# Patient Record
Sex: FEMALE | Race: BLACK OR AFRICAN AMERICAN | Employment: PART TIME | URBAN - METROPOLITAN AREA
[De-identification: names, ages, dates, MRNs, and addresses within clinical notes are randomized per-mention and may not be internally consistent; named-entity substitution may affect disease eponyms.]

---

## 2019-06-19 ENCOUNTER — HOSPITAL ENCOUNTER (EMERGENCY)
Age: 39
Discharge: HOME | End: 2019-06-19
Attending: EMERGENCY MEDICINE
Payer: SUBSIDIZED

## 2019-06-19 VITALS
TEMPERATURE: 98.8 F | RESPIRATION RATE: 18 BRPM | BODY MASS INDEX: 29.57 KG/M2 | HEIGHT: 66 IN | WEIGHT: 184 LBS | OXYGEN SATURATION: 98 % | DIASTOLIC BLOOD PRESSURE: 85 MMHG | HEART RATE: 95 BPM | SYSTOLIC BLOOD PRESSURE: 114 MMHG

## 2019-06-19 DIAGNOSIS — N89.8 VAGINAL DISCHARGE: ICD-10-CM

## 2019-06-19 DIAGNOSIS — N30.01 ACUTE CYSTITIS WITH HEMATURIA: Primary | ICD-10-CM

## 2019-06-19 LAB
APPEARANCE UR: ABNORMAL
BACTERIA URNS QL MICRO: ABNORMAL /HPF
BILIRUB UR QL: NEGATIVE
CLUE CELLS VAG QL WET PREP: NORMAL
COLOR UR: ABNORMAL
EPITH CASTS URNS QL MICRO: ABNORMAL /LPF
GLUCOSE UR STRIP.AUTO-MCNC: NEGATIVE MG/DL
HCG UR QL: NEGATIVE
HGB UR QL STRIP: ABNORMAL
KETONES UR QL STRIP.AUTO: NEGATIVE MG/DL
KOH PREP SPEC: NORMAL
LEUKOCYTE ESTERASE UR QL STRIP.AUTO: NEGATIVE
MUCOUS THREADS URNS QL MICRO: ABNORMAL /LPF
NITRITE UR QL STRIP.AUTO: POSITIVE
PH UR STRIP: 5.5 [PH] (ref 5–8)
PROT UR STRIP-MCNC: NEGATIVE MG/DL
RBC #/AREA URNS HPF: ABNORMAL /HPF (ref 0–5)
SERVICE CMNT-IMP: NORMAL
SP GR UR REFRACTOMETRY: 1.02 (ref 1–1.03)
T VAGINALIS VAG QL WET PREP: NORMAL
UA: UC IF INDICATED,UAUC: ABNORMAL
UROBILINOGEN UR QL STRIP.AUTO: 0.2 EU/DL (ref 0.2–1)
WBC URNS QL MICRO: ABNORMAL /HPF (ref 0–4)

## 2019-06-19 PROCEDURE — 81001 URINALYSIS AUTO W/SCOPE: CPT

## 2019-06-19 PROCEDURE — 87210 SMEAR WET MOUNT SALINE/INK: CPT

## 2019-06-19 PROCEDURE — 81025 URINE PREGNANCY TEST: CPT

## 2019-06-19 PROCEDURE — 99284 EMERGENCY DEPT VISIT MOD MDM: CPT

## 2019-06-19 PROCEDURE — 87491 CHLMYD TRACH DNA AMP PROBE: CPT

## 2019-06-19 PROCEDURE — 87086 URINE CULTURE/COLONY COUNT: CPT

## 2019-06-19 RX ORDER — CEPHALEXIN 500 MG/1
500 CAPSULE ORAL 2 TIMES DAILY
Qty: 14 CAP | Refills: 0 | Status: SHIPPED | OUTPATIENT
Start: 2019-06-19 | End: 2019-06-19

## 2019-06-19 RX ORDER — CEPHALEXIN 500 MG/1
500 CAPSULE ORAL 2 TIMES DAILY
Qty: 14 CAP | Refills: 0 | Status: SHIPPED | OUTPATIENT
Start: 2019-06-19 | End: 2019-06-26

## 2019-06-19 NOTE — ED TRIAGE NOTES
Patient presents to the ED with c/o white vaginal discharge x5 days. Pt denies any itching. Pt denies any urinary symptoms. Pt denies any vaginal bleeding. Pt reports an odor.

## 2019-06-19 NOTE — ED PROVIDER NOTES
EMERGENCY DEPARTMENT HISTORY AND PHYSICAL EXAM      Date: 2019  Patient Name: Joon Holland    History of Presenting Illness     Chief Complaint   Patient presents with    Vaginal Discharge       History Provided By: Patient    HPI: Joon Holland, 42 y.o. female with PMHx significant for no chronic medical hx, presents ambulatory to the ED with cc of acute moderate vaginal discharge X 3 days. No medications or modifying factors. Denies fever, chills, nausea, vomiting, urinary symptoms, genital sore or rash, abdominal pain. Denies any new sexual partner or concern for STD. There are no other complaints, changes, or physical findings at this time. PCP: None    No current facility-administered medications on file prior to encounter. Current Outpatient Medications on File Prior to Encounter   Medication Sig Dispense Refill    OTHER Indications: unknown birth control pill         Past History     Past Medical History:  History reviewed. No pertinent past medical history. Past Surgical History:  Past Surgical History:   Procedure Laterality Date    HX  SECTION         Family History:  History reviewed. No pertinent family history. Social History:  Social History     Tobacco Use    Smoking status: Never Smoker    Smokeless tobacco: Never Used   Substance Use Topics    Alcohol use: Yes     Comment: occ    Drug use: No       Allergies:  No Known Allergies      Review of Systems   Review of Systems   Constitutional: Negative. Negative for activity change, appetite change, chills and fever. HENT: Negative. Negative for congestion, ear pain, postnasal drip and sore throat. Eyes: Negative. Negative for pain and visual disturbance. Respiratory: Negative. Negative for cough and shortness of breath. Cardiovascular: Negative. Negative for chest pain. Gastrointestinal: Negative for abdominal pain, anal bleeding, diarrhea, nausea, rectal pain and vomiting. Genitourinary: Positive for vaginal discharge. Negative for difficulty urinating, dysuria, flank pain, frequency, menstrual problem, pelvic pain, urgency, vaginal bleeding and vaginal pain. Musculoskeletal: Negative. Negative for joint swelling. Skin: Negative. Negative for rash. Neurological: Negative. Negative for dizziness, light-headedness and headaches. Psychiatric/Behavioral: Negative. Physical Exam   Physical Exam   Constitutional: She is oriented to person, place, and time. She appears well-developed and well-nourished. No distress. HENT:   Head: Normocephalic and atraumatic. Right Ear: Hearing and external ear normal.   Left Ear: Hearing and external ear normal.   Nose: Nose normal.   Eyes: Pupils are equal, round, and reactive to light. Conjunctivae and EOM are normal.   Neck: Normal range of motion. Pulmonary/Chest: Effort normal. No respiratory distress. Musculoskeletal: Normal range of motion. Neurological: She is alert and oriented to person, place, and time. Skin: Skin is warm and dry. She is not diaphoretic. Psychiatric: She has a normal mood and affect. Her behavior is normal. Judgment and thought content normal.   Nursing note and vitals reviewed.       Diagnostic Study Results     Labs -     Recent Results (from the past 12 hour(s))   WET PREP    Collection Time: 06/19/19  3:34 PM   Result Value Ref Range    Clue cells CLUE CELLS ABSENT      Wet prep NO TRICHOMONAS SEEN     KOH, OTHER SOURCES    Collection Time: 06/19/19  3:34 PM   Result Value Ref Range    Special Requests: NO SPECIAL REQUESTS      KOH NO YEAST SEEN     URINALYSIS W/ REFLEX CULTURE    Collection Time: 06/19/19  3:34 PM   Result Value Ref Range    Color YELLOW/STRAW      Appearance CLOUDY (A) CLEAR      Specific gravity 1.025 1.003 - 1.030      pH (UA) 5.5 5.0 - 8.0      Protein NEGATIVE  NEG mg/dL    Glucose NEGATIVE  NEG mg/dL    Ketone NEGATIVE  NEG mg/dL    Bilirubin NEGATIVE  NEG      Blood SMALL (A) NEG      Urobilinogen 0.2 0.2 - 1.0 EU/dL    Nitrites POSITIVE (A) NEG      Leukocyte Esterase NEGATIVE  NEG      WBC 5-10 0 - 4 /hpf    RBC 5-10 0 - 5 /hpf    Epithelial cells MODERATE (A) FEW /lpf    Bacteria 4+ (A) NEG /hpf    UA:UC IF INDICATED URINE CULTURE ORDERED (A) CNI      Mucus 1+ (A) NEG /lpf   HCG URINE, QL. - POC    Collection Time: 06/19/19  3:37 PM   Result Value Ref Range    Pregnancy test,urine (POC) NEGATIVE  NEG         Radiologic Studies -   No orders to display     CT Results  (Last 48 hours)    None        CXR Results  (Last 48 hours)    None            Medical Decision Making   I am the first provider for this patient. I reviewed the vital signs, available nursing notes, past medical history, past surgical history, family history and social history. Vital Signs-Reviewed the patient's vital signs. Patient Vitals for the past 12 hrs:   Temp Pulse Resp BP SpO2   06/19/19 1457 98.8 °F (37.1 °C) 95 18 114/85 98 %       Pulse Oximetry Analysis - 98% on RA    Records Reviewed: Nursing Notes, Old Medical Records, Previous Radiology Studies and Previous Laboratory Studies    Provider Notes (Medical Decision Making):   Patient presents with vaginal discharge. DDx: Yeast infection, Chlamydia, gonorrhea, trichomonas, herpes, BV, UTI. Will get GC, UA, wet prep, indy. Pt denies concern for STI and refuses tx at this time. Educated pt on risk of PID, infertility and possible sepsis/ death. Pt endorses understanding. Pt has been re-examined and states that they are feeling better and have no new complaints. Laboratory tests, medications, x-rays, diagnosis, follow up plan and return instructions have been reviewed and discussed with the patient. The patient has had the opportunity to ask questions about their care. Patient expresses understanding and agreement with care plan, follow up and return instructions.   Patent agrees to return in 48 hours if their symptoms are not improving or immediately if they have any change in their condition. Patient expresses understanding that genital swabs for G/C are pending and that they should refrain from sexual activity until the results are known. Patient expresses understanding that it is their responsibility to call back to the ER in 72 hours to obtain the results of the G/C swabs if they have not been contacted by the ER. ED Course:   Initial assessment performed. The patients presenting problems have been discussed, and they are in agreement with the care plan formulated and outlined with them. I have encouraged them to ask questions as they arise throughout their visit. Critical Care Time:   0    Disposition:  4:10 PM  I have discussed with patient their diagnosis, treatment, and follow up plan. The patient agrees to follow up as outlined in discharge paperwork and also to return to the ED with any worsening. Olive Robles PA-C    PLAN:  1. Current Discharge Medication List      START taking these medications    Details   cephALEXin (KEFLEX) 500 mg capsule Take 1 Cap by mouth two (2) times a day for 7 days. Qty: 14 Cap, Refills: 0         CONTINUE these medications which have NOT CHANGED    Details   OTHER Indications: unknown birth control pill         STOP taking these medications       HYDROcodone-acetaminophen (NORCO) 5-325 mg per tablet Comments:   Reason for Stoppin.   Follow-up Information     Follow up With Specialties Details Why [address] [facility]  Schedule an appointment as soon as possible for a visit in 1 week As needed, If symptoms worsen [address] [facility] [address] [address] [facility]  [phone]        Return to ED if worse     Diagnosis     Clinical Impression:   1. Acute cystitis with hematuria    2. Vaginal discharge        Attestations:    Please note that this dictation was completed with Dragon, computer voice recognition software.   Quite often unanticipated grammatical, syntax, homophones, and other interpretive errors are inadvertently transcribed by the computer software. Please disregard these errors. Additionally, please excuse any errors that have escaped final proofreading.

## 2019-06-21 LAB
BACTERIA SPEC CULT: ABNORMAL
C TRACH DNA SPEC QL NAA+PROBE: NEGATIVE
CC UR VC: ABNORMAL
N GONORRHOEA DNA SPEC QL NAA+PROBE: NEGATIVE
SAMPLE TYPE: NORMAL
SERVICE CMNT-IMP: ABNORMAL
SERVICE CMNT-IMP: NORMAL
SPECIMEN SOURCE: NORMAL

## 2021-12-20 NOTE — ED NOTES
Felicia George at bedside reviewing patient's discharge instructions and reviewing medications. Patient ambulatory home. Patient in no apparent distress.  Unable to reassess pain/vitals
Pt reports vaginal discharge that smells like \"ammonia\" for the past 5 days. Pt denies dysuria, oliguria, polyuria. Pt denies other sx. Emergency Department Nursing Plan of Care       The Nursing Plan of Care is developed from the Nursing assessment and Emergency Department Attending provider initial evaluation. The plan of care may be reviewed in the ED Provider note.     The Plan of Care was developed with the following considerations:   Patient / Family readiness to learn indicated by:verbalized understanding  Persons(s) to be included in education: patient  Barriers to Learning/Limitations:No    Signed     Chris Thomas    6/19/2019   3:29 PM
- - -

## 2022-04-06 ENCOUNTER — HOSPITAL ENCOUNTER (EMERGENCY)
Age: 42
Discharge: HOME | End: 2022-04-06
Attending: EMERGENCY MEDICINE
Payer: MEDICAID

## 2022-04-06 VITALS
WEIGHT: 180 LBS | OXYGEN SATURATION: 99 % | SYSTOLIC BLOOD PRESSURE: 138 MMHG | HEART RATE: 94 BPM | RESPIRATION RATE: 16 BRPM | TEMPERATURE: 99.4 F | BODY MASS INDEX: 28.93 KG/M2 | DIASTOLIC BLOOD PRESSURE: 75 MMHG | HEIGHT: 66 IN

## 2022-04-06 DIAGNOSIS — R59.0 LYMPHADENOPATHY, CERVICAL: ICD-10-CM

## 2022-04-06 DIAGNOSIS — H92.02 OTALGIA OF LEFT EAR: Primary | ICD-10-CM

## 2022-04-06 DIAGNOSIS — J01.90 ACUTE SINUSITIS, RECURRENCE NOT SPECIFIED, UNSPECIFIED LOCATION: ICD-10-CM

## 2022-04-06 PROCEDURE — 99283 EMERGENCY DEPT VISIT LOW MDM: CPT

## 2022-04-06 RX ORDER — CETIRIZINE HCL 10 MG
10 TABLET ORAL DAILY
Qty: 30 TABLET | Refills: 0 | Status: SHIPPED | OUTPATIENT
Start: 2022-04-06 | End: 2022-05-06

## 2022-04-06 RX ORDER — AMOXICILLIN AND CLAVULANATE POTASSIUM 875; 125 MG/1; MG/1
1 TABLET, FILM COATED ORAL 2 TIMES DAILY
Qty: 14 TABLET | Refills: 0 | Status: SHIPPED | OUTPATIENT
Start: 2022-04-06 | End: 2022-04-13

## 2022-04-06 RX ORDER — FLUTICASONE PROPIONATE 50 MCG
2 SPRAY, SUSPENSION (ML) NASAL DAILY
Qty: 16 G | Refills: 0 | Status: SHIPPED | OUTPATIENT
Start: 2022-04-06

## 2022-04-06 NOTE — ED NOTES

## 2022-04-07 NOTE — ED PROVIDER NOTES
EMERGENCY DEPARTMENT HISTORY AND PHYSICAL EXAM      Date: 2022  Patient Name: Renate Bailon    History of Presenting Illness     Chief Complaint   Patient presents with    Ear Pain       History Provided By: Patient    HPI: Renate Bailon, 46 y.o. female with no reported chronic medical conditions, presents ambulatory to the ED for evaluation of left ear pain and swollen lymph node for the past week. States ear pain is described as a fullness or like \"somethings inside. \" Denies any drainage or bleeding. Denies any changes in hearing or hearing loss. Endorses mild, soreness to inflamed lymph node to left neck. Denies facial swelling or rashes. Denies sore throat, difficulty swallowing, difficulty opening mouth. Difficulty speaking or changes in voice. Denies dental pain. Tolerating solids and liquids well, no changes in bowel or bladder habits. Denies trauma. Denies headache, eye pain, changes in vision,  neck pain, chest pain, SOB. Denies fevers or chills, abdominal pain, N/V/D, rashes, weakness or LOC. Has not taken any medications or attempted any symptomatic management techniques prior to arrival. Denies night sweats or weight loss. No other complaints at this time. There are no other complaints, changes, or physical findings at this time. PCP: None    No current facility-administered medications on file prior to encounter. Current Outpatient Medications on File Prior to Encounter   Medication Sig Dispense Refill    OTHER Indications: unknown birth control pill         Past History     Past Medical History:  History reviewed. No pertinent past medical history. Past Surgical History:  Past Surgical History:   Procedure Laterality Date    HX  SECTION         Family History:  History reviewed. No pertinent family history.     Social History:  Social History     Tobacco Use    Smoking status: Never Smoker    Smokeless tobacco: Never Used   Substance Use Topics    Alcohol use: Yes     Comment: occ    Drug use: No       Allergies:  No Known Allergies      Review of Systems   Review of Systems   Constitutional: Negative for appetite change, chills and fever. HENT: Positive for ear pain. Negative for congestion, dental problem, drooling, ear discharge, facial swelling, hearing loss, sore throat, tinnitus, trouble swallowing and voice change. Eyes: Negative for pain and visual disturbance. Respiratory: Negative for cough and shortness of breath. Cardiovascular: Negative for chest pain. Gastrointestinal: Negative for abdominal pain, constipation, diarrhea, nausea and vomiting. Genitourinary: Negative for difficulty urinating, dysuria and frequency. Musculoskeletal: Negative for neck pain and neck stiffness. Skin: Negative for rash. Neurological: Negative for syncope, weakness, light-headedness and headaches. All other systems reviewed and are negative. Physical Exam   Physical Exam  Vitals and nursing note reviewed. Constitutional:       General: She is not in acute distress. Appearance: Normal appearance. She is normal weight. She is not ill-appearing, toxic-appearing or diaphoretic. Comments: 46 y.o. female   HENT:      Head: Normocephalic and atraumatic. Jaw: No trismus, tenderness, swelling, pain on movement or malocclusion. Right Ear: Tympanic membrane, ear canal and external ear normal. No drainage or tenderness. No mastoid tenderness. Tympanic membrane is not perforated, erythematous or bulging. Left Ear: Tympanic membrane, ear canal and external ear normal. No drainage or tenderness. No mastoid tenderness. Tympanic membrane is not perforated, erythematous or bulging. Nose: Nose normal.      Comments: Nares erythematous and boggy with mild congestion      Mouth/Throat:      Mouth: Mucous membranes are moist. No oral lesions or angioedema. Dentition: Normal dentition. Pharynx: Oropharynx is clear. Uvula midline.  No pharyngeal swelling, oropharyngeal exudate, posterior oropharyngeal erythema or uvula swelling. Comments: No Krzysztof's angina  Eyes:      Extraocular Movements: Extraocular movements intact. Conjunctiva/sclera: Conjunctivae normal.   Neck:      Thyroid: No thyroid mass, thyromegaly or thyroid tenderness. Trachea: Trachea and phonation normal.      Comments: No facial swelling or rashes. Clear and coherent speech. Tolerating secretions well. Cardiovascular:      Rate and Rhythm: Normal rate and regular rhythm. Heart sounds: Normal heart sounds. No murmur heard. No friction rub. No gallop. Pulmonary:      Effort: Pulmonary effort is normal. No respiratory distress. Breath sounds: Normal breath sounds. No stridor. No wheezing. Abdominal:      General: There is no distension. Palpations: Abdomen is soft. There is no mass. Tenderness: There is no abdominal tenderness. There is no guarding. Musculoskeletal:         General: Normal range of motion. Cervical back: Normal range of motion. Lymphadenopathy:      Cervical: Cervical adenopathy present. Left cervical: Superficial cervical adenopathy present. Skin:     General: Skin is warm and dry. Neurological:      General: No focal deficit present. Mental Status: She is alert and oriented to person, place, and time. Psychiatric:         Mood and Affect: Mood normal.         Behavior: Behavior normal.         Diagnostic Study Results     Labs -   No results found for this or any previous visit (from the past 12 hour(s)). Radiologic Studies -   No orders to display     CT Results  (Last 48 hours)    None        CXR Results  (Last 48 hours)    None            Medical Decision Making   I am the first provider for this patient. I reviewed the vital signs, available nursing notes, past medical history, past surgical history, family history and social history.     Vital Signs-Reviewed the patient's vital signs. Patient Vitals for the past 12 hrs:   Temp Pulse Resp BP SpO2   04/06/22 1657 99.4 °F (37.4 °C) 94 16 138/75 99 %       Records Reviewed: Nursing Notes and Old Medical Records    Provider Notes (Medical Decision Making):   Patient presents ED for evaluation of ear fullness, and enlarged left cervical lymph node for the past 2 weeks. Nares erythematous and boggy with moderate congestion. Suspect this is likely secondary to sinusitis, given duration of symptoms will place on antibiotics. Counseled use of over-the-counter allergy medications (provided prescription for Zyrtec and Flonase. Patient is very well appearing. Afebrile, nontoxic-appearing. No evidence of TM perforation, mastoiditis, malignant otitis externa. No suggestion of infectious, airway, or ENT immediately life-threatening medical condition. No suggestion of epiglottitis, Ludwigs angina, or other obstructive or infectious emergent condition. Shared decision making performed and care plan created together, discussed diagnosis and treatment plan. Counseled additional symptomatic management techniques. PCP follow-up. ENT follow-up if needed. Verbal return precautions discussed at length. Patient verbalizes understanding and agreement of current plan of care. ED Course:   Initial assessment performed. The patients presenting problems have been discussed, and they are in agreement with the care plan formulated and outlined with them. I have encouraged them to ask questions as they arise throughout their visit. Critical Care Time: None    Disposition:  Discharge     PLAN:  1. Discharge Medication List as of 4/6/2022  6:51 PM      START taking these medications    Details   amoxicillin-clavulanate (Augmentin) 875-125 mg per tablet Take 1 Tablet by mouth two (2) times a day for 7 days.  Indications: acute bacterial infection of the sinuses, Normal, Disp-14 Tablet, R-0      fluticasone propionate (FLONASE) 50 mcg/actuation nasal spray 2 Sprays by Both Nostrils route daily. , Normal, Disp-16 g, R-0      cetirizine (ZyrTEC) 10 mg tablet Take 1 Tablet by mouth daily for 30 days. , Normal, Disp-30 Tablet, R-0         CONTINUE these medications which have NOT CHANGED    Details   OTHER Indications: unknown birth control pill, Historical Med           2. Follow-up Information     Follow up With Specialties Details Why [address] [facility] EMERGENCY DEPT Emergency Medicine  As needed, If symptoms worsen [address]  In 1 week  [address], [address] [facility] [address]    [address] [facility] ENT Otolaryngology  As needed, If symptoms worsen [address] [facility]  [phone]        Return to ED if worse     Diagnosis     Clinical Impression:   1. Otalgia of left ear    2. Lymphadenopathy, cervical    3. Acute sinusitis, recurrence not specified, unspecified location          Please note that this dictation was completed with Beezag, the computer voice recognition software. Quite often unanticipated grammatical, syntax, homophones, and other interpretive errors are inadvertently transcribed by the computer software. Please disregards these errors. Please excuse any errors that have escaped final proofreading.

## 2022-09-04 ENCOUNTER — APPOINTMENT (OUTPATIENT)
Dept: GENERAL RADIOLOGY | Age: 42
End: 2022-09-04
Attending: PHYSICIAN ASSISTANT
Payer: COMMERCIAL

## 2022-09-04 ENCOUNTER — HOSPITAL ENCOUNTER (EMERGENCY)
Age: 42
Discharge: HOME | End: 2022-09-04
Attending: EMERGENCY MEDICINE
Payer: COMMERCIAL

## 2022-09-04 VITALS
BODY MASS INDEX: 28.93 KG/M2 | OXYGEN SATURATION: 96 % | HEART RATE: 83 BPM | WEIGHT: 180 LBS | HEIGHT: 66 IN | DIASTOLIC BLOOD PRESSURE: 83 MMHG | RESPIRATION RATE: 16 BRPM | SYSTOLIC BLOOD PRESSURE: 141 MMHG | TEMPERATURE: 97.4 F

## 2022-09-04 DIAGNOSIS — M19.072 OSTEOARTHRITIS OF FIRST METATARSOPHALANGEAL (MTP) JOINT OF LEFT FOOT: Primary | ICD-10-CM

## 2022-09-04 PROCEDURE — 99283 EMERGENCY DEPT VISIT LOW MDM: CPT

## 2022-09-04 PROCEDURE — 73630 X-RAY EXAM OF FOOT: CPT

## 2022-09-04 RX ORDER — IBUPROFEN 800 MG/1
800 TABLET ORAL
Qty: 20 TABLET | Refills: 0 | Status: SHIPPED | OUTPATIENT
Start: 2022-09-04 | End: 2022-09-11

## 2022-09-04 RX ORDER — DICLOFENAC SODIUM 75 MG/1
75 TABLET, DELAYED RELEASE ORAL
Qty: 20 TABLET | Refills: 0 | Status: SHIPPED | OUTPATIENT
Start: 2022-09-04

## 2022-09-04 NOTE — ED NOTES
Discharge and medication instructions reviewed with the patient.  Patient discharged ambulatory with a steady gait

## 2022-09-04 NOTE — ED NOTES
Emergency Department Nursing Plan of Care       The Nursing Plan of Care is developed from the Nursing assessment and Emergency Department Attending provider initial evaluation. The plan of care may be reviewed in the ED Provider note.     The Plan of Care was developed with the following considerations:   Patient / Family readiness to learn indicated by:verbalized understanding  Persons(s) to be included in education: patient  Barriers to Learning/Limitations:No    Signed     Jun Hensley RN    9/4/2022   12:57 PM

## 2022-09-04 NOTE — ED PROVIDER NOTES
EMERGENCY DEPARTMENT HISTORY AND PHYSICAL EXAM      Date: 2022  Patient Name: Vitaly Wong    History of Presenting Illness     Chief Complaint   Patient presents with    Foot Burn     Foot pain that began today after walking. Patient states that she had a fall two years ago and injured left foot. Foot Pain       History Provided By: Patient    HPI: Vitaly Wong, 38 y.o. female with no significant past medical history, presents to the ED with cc of left foot pain. The patient reports that she fell a few years ago and injured her left foot. She reports it never fully healed and she has had intermittent pain since then. The pain is located to the first distal metatarsal and MTP joint and is worse with weightbearing. She has taken ibuprofen with some relief. She reports she never had x-rays done since the injury. She denies numbness. There are no other complaints, changes, or physical findings at this time. PCP: None    No current facility-administered medications on file prior to encounter. Current Outpatient Medications on File Prior to Encounter   Medication Sig Dispense Refill    fluticasone propionate (FLONASE) 50 mcg/actuation nasal spray 2 Sprays by Both Nostrils route daily. 16 g 0    OTHER Indications: unknown birth control pill         Past History     Past Medical History:  History reviewed. No pertinent past medical history. Past Surgical History:  Past Surgical History:   Procedure Laterality Date    HX  SECTION         Family History:  History reviewed. No pertinent family history. Social History:  Social History     Tobacco Use    Smoking status: Never    Smokeless tobacco: Never   Substance Use Topics    Alcohol use: Yes     Comment: occ    Drug use: No       Allergies:  No Known Allergies      Review of Systems   Review of Systems   Constitutional:  Negative for chills and fever. HENT:  Negative for ear pain and sore throat.     Eyes:  Negative for redness and visual disturbance. Respiratory:  Negative for cough and shortness of breath. Cardiovascular:  Negative for chest pain and palpitations. Gastrointestinal:  Negative for abdominal pain, nausea and vomiting. Genitourinary:  Negative for dysuria and hematuria. Musculoskeletal:  Negative for back pain and gait problem. Positive for left foot pain   Skin:  Negative for rash and wound. Neurological:  Negative for dizziness and headaches. Psychiatric/Behavioral:  Negative for behavioral problems and confusion. All other systems reviewed and are negative. Physical Exam   Physical Exam  Vitals and nursing note reviewed. Constitutional:       Appearance: She is not toxic-appearing. HENT:      Head: Normocephalic and atraumatic. Mouth/Throat:      Mouth: Mucous membranes are moist.   Eyes:      Extraocular Movements: Extraocular movements intact. Pupils: Pupils are equal, round, and reactive to light. Cardiovascular:      Rate and Rhythm: Normal rate and regular rhythm. Pulmonary:      Effort: Pulmonary effort is normal. No respiratory distress. Musculoskeletal:         General: No deformity. Normal range of motion. Cervical back: Normal range of motion and neck supple. Comments: Tenderness to palpation over the left distal first metatarsal and MTP joint. No overlying erythema or warmth. No obvious deformity. 2+ DP and PT pulses. Capillary refill less than 2 seconds in the distal toe. Distal sensation intact. Skin:     General: Skin is warm and dry. Neurological:      General: No focal deficit present. Mental Status: She is alert and oriented to person, place, and time. Psychiatric:         Behavior: Behavior normal.         Diagnostic Study Results     Labs -   No results found for this or any previous visit (from the past 12 hour(s)).     Radiologic Studies -   XR FOOT LT MIN 3 V   Final Result   Nonspecific plantar soft tissue swelling CT Results  (Last 48 hours)      None          CXR Results  (Last 48 hours)      None              Medical Decision Making   I am the first provider for this patient. I reviewed the vital signs, available nursing notes, past medical history, past surgical history, family history and social history. Vital Signs-Reviewed the patient's vital signs. Patient Vitals for the past 12 hrs:   Temp Pulse Resp BP SpO2   09/04/22 1259 97.4 °F (36.3 °C) -- -- -- --   09/04/22 1233 -- 83 16 (!) 141/83 96 %         Records Reviewed: Nursing Notes and Old Medical Records      Provider Notes (Medical Decision Making):   DDx: Osteoarthritis, chronic fracture, sprain, gout    This is a 74-year-old female who presents with intermittent left foot pain after trauma a few years ago. There is no evidence of infection on exam.  Plan for x-ray. ED Course:   Initial assessment performed. The patients presenting problems have been discussed, and they are in agreement with the care plan formulated and outlined with them. I have encouraged them to ask questions as they arise throughout their visit. Disposition:  1:42 PM  The patient has been re-evaluated and is ready for discharge. Reviewed available results with patient. Counseled patient on diagnosis and care plan. Patient has expressed understanding, and all questions have been answered. Patient agrees with plan and agrees to follow up as recommended, or to return to the ED if their symptoms worsen. Discharge instructions have been provided and explained to the patient, along with reasons to return to the ED. PLAN:  1.    Discharge Medication List as of 9/4/2022  1:42 PM        START taking these medications    Details   diclofenac EC (VOLTAREN) 75 mg EC tablet Take 1 Tablet by mouth two (2) times daily as needed for Pain., Normal, Disp-20 Tablet, R-0Patient instructed not to take diclofenac and ibuprofen at the same time, but wants to see which one will work better. ibuprofen (MOTRIN) 800 mg tablet Take 1 Tablet by mouth every six (6) hours as needed for Pain for up to 7 days. , Normal, Disp-20 Tablet, R-0Patient instructed not to take diclofenac and ibuprofen at the same time, but wants to see which one will work better. CONTINUE these medications which have NOT CHANGED    Details   fluticasone propionate (FLONASE) 50 mcg/actuation nasal spray 2 Sprays by Both Nostrils route daily. , Normal, Disp-16 g, R-0      OTHER Indications: unknown birth control pill, Historical Med           2. Follow-up Information       Follow up With Specialties Details Why Contact Info    Nina Peck DPM Podiatry Call  to schedule a follow up with foot specialist [address] [place] [address]  [phone]            Return to ED if worse     Diagnosis     Clinical Impression:   1. 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

## 2022-09-04 NOTE — DISCHARGE INSTRUCTIONS
Do not take ibuprofen and diclofenac at the same time. Narrative:    EXAM: XR FOOT LT MIN 3 V     INDICATION: left foot pain, intermittently for some time. COMPARISON: None. FINDINGS: Three views of the left foot demonstrate no fracture or other acute   osseous or articular abnormality. There is mild osteoarthritis of the first MTP   joint. Plantar soft tissue swelling is seen.

## 2023-01-14 ENCOUNTER — HOSPITAL ENCOUNTER (EMERGENCY)
Age: 43
Discharge: HOME | End: 2023-01-14
Attending: EMERGENCY MEDICINE
Payer: COMMERCIAL

## 2023-01-14 VITALS
BODY MASS INDEX: 28.93 KG/M2 | WEIGHT: 180 LBS | OXYGEN SATURATION: 99 % | DIASTOLIC BLOOD PRESSURE: 87 MMHG | RESPIRATION RATE: 18 BRPM | SYSTOLIC BLOOD PRESSURE: 138 MMHG | HEIGHT: 66 IN | HEART RATE: 89 BPM | TEMPERATURE: 99.8 F

## 2023-01-14 DIAGNOSIS — S01.511A LIP LACERATION, INITIAL ENCOUNTER: Primary | ICD-10-CM

## 2023-01-14 PROCEDURE — 75810000293 HC SIMP/SUPERF WND  RPR

## 2023-01-14 PROCEDURE — 99282 EMERGENCY DEPT VISIT SF MDM: CPT

## 2023-01-14 NOTE — LETTER
[facility] EMERGENCY DEPT  [address] [facility] [address] [phone]    Work/School Note    Date: 1/14/2023    To Whom It May concern:    Vickie Benz was seen and treated today in the emergency room by the following provider(s):  Attending Provider: Naeem Guerra DO  Nurse Practitioner: Roanne Hashimoto, NP. Vickie Benz may return to work on Jan 16 2023.     Sincerely,          Jolanta Dueñas NP

## 2023-01-15 NOTE — ED TRIAGE NOTES
Chief Complaint   Patient presents with    Lip Laceration     Patient presents to the ED for a left lip laceration that occurred PTA after she tripped, fell, and hit her lip on the step. Denies head injury or LOC. Laceration noted to L upper lip, bleeding controlled.

## 2023-01-15 NOTE — ED PROVIDER NOTES
[facility] EMERGENCY DEPT  EMERGENCY DEPARTMENT ENCOUNTER       Pt Name: Beck Ruano  MRN: [id number]  [place] 1980  Date of evaluation: 2023  Provider: Caridad Clemens NP   PCP: None  Note Started: 12:28 AM 1/15/23     CHIEF COMPLAINT       Chief Complaint   Patient presents with    Lip Laceration        HISTORY OF PRESENT ILLNESS: 1 or more elements      History From: Patient  HPI Limitations : None     Beck Ruano is a 38 y.o. female who presents with lip laceration acute onset just prior to arrival.  Patient states she was going up the stairs and fell and hit her lip on the step. Patient states she had been drinking. She denies hitting her head or loss of consciousness. Status not up-to-date. Nursing Notes were all reviewed and agreed with or any disagreements were addressed in the HPI. REVIEW OF SYSTEMS      Review of Systems   Constitutional:  Negative for fatigue and fever. Respiratory:  Negative for shortness of breath and wheezing. Cardiovascular:  Negative for chest pain. Gastrointestinal:  Negative for abdominal pain. Musculoskeletal:  Negative for arthralgias, myalgias, neck pain and neck stiffness. Skin:  Positive for wound. Negative for pallor and rash. Neurological:  Negative for dizziness, tremors and headaches. All other systems reviewed and are negative. Positives and Pertinent negatives as per HPI. PAST HISTORY     Past Medical History:  History reviewed. No pertinent past medical history. Past Surgical History:  Past Surgical History:   Procedure Laterality Date    HX  SECTION         Family History:  History reviewed. No pertinent family history.     Social History:  Social History     Tobacco Use    Smoking status: Never    Smokeless tobacco: Never   Substance Use Topics    Alcohol use: Yes     Comment: occ    Drug use: No       Allergies:  No Known Allergies    CURRENT MEDICATIONS      Discharge Medication List as of 2023 11:03 PM        CONTINUE these medications which have NOT CHANGED    Details   diclofenac EC (VOLTAREN) 75 mg EC tablet Take 1 Tablet by mouth two (2) times daily as needed for Pain., Normal, Disp-20 Tablet, R-0Patient instructed not to take diclofenac and ibuprofen at the same time, but wants to see which one will work better. fluticasone propionate (FLONASE) 50 mcg/actuation nasal spray 2 Sprays by Both Nostrils route daily. , Normal, Disp-16 g, R-0      OTHER Indications: unknown birth control pill, Historical Med             PHYSICAL EXAM      ED Triage Vitals [01/14/23 2201]   ED Encounter Vitals Group      /87      Pulse (Heart Rate) 89      Resp Rate 18      Temp 99.8 °F (37.7 °C)      Temp src       O2 Sat (%) 99 %      Weight 180 lb      Height 5' 6\"        Physical Exam  Vitals and nursing note reviewed. Constitutional:       General: She is not in acute distress. Appearance: Normal appearance. She is well-developed. HENT:      Head: Normocephalic and atraumatic. Right Ear: External ear normal.      Left Ear: External ear normal.      Nose: Nose normal.      Mouth/Throat:      Mouth: Mucous membranes are moist.     Eyes:      Conjunctiva/sclera: Conjunctivae normal.   Cardiovascular:      Rate and Rhythm: Normal rate and regular rhythm. Heart sounds: Normal heart sounds. Pulmonary:      Effort: Pulmonary effort is normal. No respiratory distress. Breath sounds: Normal breath sounds. No wheezing. Abdominal:      General: Bowel sounds are normal.      Palpations: Abdomen is soft. Tenderness: There is no abdominal tenderness. Musculoskeletal:         General: Normal range of motion. Cervical back: Normal range of motion and neck supple. Lymphadenopathy:      Cervical: No cervical adenopathy. Skin:     General: Skin is warm and dry. Findings: No rash. Neurological:      Mental Status: She is alert and oriented to person, place, and time.       Cranial Nerves: No cranial nerve deficit. Coordination: Coordination normal.   Psychiatric:         Behavior: Behavior normal.         Thought Content: Thought content normal.         Judgment: Judgment normal.        DIAGNOSTIC RESULTS   LABS:     No results found for this or any previous visit (from the past 12 hour(s)). EKG: When ordered, EKG's are interpreted by the Emergency Department Physician in the absence of a cardiologist.  Please see their note for interpretation of EKG. RADIOLOGY:  Non-plain film images such as CT, Ultrasound and MRI are read by the radiologist. Plain radiographic images are visualized and preliminarily interpreted by the ED Provider with the below findings:          Interpretation per the Radiologist below, if available at the time of this note:     No results found. PROCEDURES   Unless otherwise noted below, none  Wound Repair    Date/Time: 1/14/2023 10:30 PM  Performed by: [address] [place] [address] provider: Dr Cari Jaramillo  Preparation: sterile field established (salilne)  Pre-procedure re-eval: Immediately prior to the procedure, the patient was reevaluated and found suitable for the planned procedure and any planned medications. Location: upper l ip. Wound length: two one cm lacerations. Anesthesia:  Local Anesthetic: lidocaine 1% without epinephrine  Anesthetic total: 1 mL  Foreign bodies: no foreign bodies  Irrigation solution: saline  Irrigation method: tap  Skin closure: 5-0 nylon  Number of sutures: 3  Technique: simple and interrupted  Approximation: close  Lip approximation: vermillion border well aligned  Dressing: antibiotic ointment  My total time at bedside, performing this procedure was 1-15 minutes.     Wound Repair    Date/Time: 1/14/2023 10:40 PM  Performed by: NPSupervising provider: Dr Cari Jaramillo  Preparation: sterile field established (saline)  Pre-procedure re-eval: Immediately prior to the procedure, the patient was reevaluated and found suitable for the planned procedure and any planned medications. Time out: Immediately prior to the procedure a time out was called to verify the correct patient, procedure, equipment, staff and marking as appropriate. .  Location details: lip  Wound length: 1 cm. Anesthesia:  Local Anesthetic: lidocaine 1% without epinephrine  Anesthetic total: 1 mL  Foreign bodies: no foreign bodies  Irrigation solution: saline  Irrigation method: tap  Debridement: none  Skin closure: 5-0 nylon  Number of sutures: 4  Technique: simple and interrupted  Dressing: antibiotic ointment  My total time at bedside, performing this procedure was 1-15 minutes. CRITICAL CARE TIME       EMERGENCY DEPARTMENT COURSE and DIFFERENTIAL DIAGNOSIS/MDM   Vitals:    Vitals:    01/14/23 2201   BP: 138/87   Pulse: 89   Resp: 18   Temp: 99.8 °F (37.7 °C)   SpO2: 99%   Weight: 81.6 kg (180 lb)   Height: 5' 6\" (1.676 m)        Patient was given the following medications:  Medications - No data to display    CONSULTS: (Who and What was discussed)  None    Chronic Conditions: none    Social Determinants affecting Dx or Tx: None    Records Reviewed (source and summary of external records): Nursing Notes    CC/HPI Summary, DDx, ED Course, and Reassessment: 44-year-old female presents with upper lip laceration acute onset just prior to arrival hit her face on stairs no loss of consciousness no trauma to teeth or inner lip exam 2 jagged lacerations to upper lip do not cross the vermilion border will require sutures differential diagnosis laceration versus puncture wound     ED course: Patient concern regarding sutures being placed. Advised patient laceration do require sutures and patient is in agreement at this time. Disposition Considerations (Tests not done, Shared Decision Making, Pt Expectation of Test or Tx.):      FINAL IMPRESSION     1.  Lip laceration, initial encounter          DISPOSITION/PLAN   Discharged  Patient declined tetanus immunization  Discharge Note: The patient is stable for discharge home. The signs, symptoms, diagnosis, and discharge instructions have been discussed, understanding conveyed, and agreed upon. The patient is to follow up as recommended or return to ER should their symptoms worsen. Suture Removal in 5 days. PATIENT REFERRED TO:  Follow-up Information       Follow up With Specialties Details Why Contact Info    [facility] EMERGENCY DEPT Emergency Medicine   Dionisio Flowers  [phone]              DISCHARGE MEDICATIONS:  Discharge Medication List as of 1/14/2023 11:03 PM            DISCONTINUED MEDICATIONS:  Discharge Medication List as of 1/14/2023 11:03 PM          Shared Not Shared MARIBELL: I have seen and evaluated the patient. My supervision physician was available for consultation. I am the Primary Clinician of Record. Parker Zuluaga NP (electronically signed)    (Please note that parts of this dictation were completed with voice recognition software. Quite often unanticipated grammatical, syntax, homophones, and other interpretive errors are inadvertently transcribed by the computer software. Please disregards these errors.  Please excuse any errors that have escaped final proofreading.)

## 2023-01-21 ENCOUNTER — HOSPITAL ENCOUNTER (EMERGENCY)
Age: 43
Discharge: HOME | End: 2023-01-21
Attending: EMERGENCY MEDICINE
Payer: COMMERCIAL

## 2023-01-21 VITALS
HEIGHT: 67 IN | DIASTOLIC BLOOD PRESSURE: 75 MMHG | OXYGEN SATURATION: 96 % | SYSTOLIC BLOOD PRESSURE: 133 MMHG | BODY MASS INDEX: 28.25 KG/M2 | HEART RATE: 93 BPM | RESPIRATION RATE: 18 BRPM | WEIGHT: 180 LBS | TEMPERATURE: 98 F

## 2023-01-21 DIAGNOSIS — Z48.02 ENCOUNTER FOR REMOVAL OF SUTURES: Primary | ICD-10-CM

## 2023-01-21 DIAGNOSIS — T81.30XA WOUND DEHISCENCE: ICD-10-CM

## 2023-01-21 PROCEDURE — 99283 EMERGENCY DEPT VISIT LOW MDM: CPT

## 2023-01-21 RX ORDER — CEPHALEXIN 500 MG/1
500 CAPSULE ORAL 2 TIMES DAILY
Qty: 14 CAPSULE | Refills: 0 | Status: SHIPPED | OUTPATIENT
Start: 2023-01-21 | End: 2023-01-28

## 2023-01-21 NOTE — ED PROVIDER NOTES
[facility] EMERGENCY DEPT  EMERGENCY DEPARTMENT ENCOUNTER       Pt Name: Luis Roman  MRN: [id number]  [place] 1980  Date of evaluation: 2023  Provider: Federico Plascencia NP   PCP: None  Note Started: 1:17 PM 23     ED attending involment: I have seen and evaluated the patient. My supervision physician was available for consultation. CHIEF COMPLAINT       Chief Complaint   Patient presents with    Suture Removal        HISTORY OF PRESENT ILLNESS: 1 or more elements      History From: Patient  HPI Limitations : None     Luis Roman is a 38 y.o. female who presents for removal.  Patient sustained sutures to laceration located on the upper lip approximately 1 week ago. She reports initially seen pus and drainage but never returned for evaluation. In addition she has not applied anything to the site. Denies fever, chills, redness, swelling of her lip. Nursing Notes were all reviewed and agreed with or any disagreements were addressed in the HPI. REVIEW OF SYSTEMS      Review of Systems   Constitutional:  Negative for appetite change, chills, fatigue and fever. HENT:  Negative for congestion, ear pain and rhinorrhea. Eyes:  Negative for pain and itching. Respiratory:  Negative for cough and shortness of breath. Cardiovascular:  Negative for chest pain. Gastrointestinal:  Negative for abdominal pain, nausea and vomiting. Musculoskeletal:  Negative for arthralgias and joint swelling. Skin:  Positive for wound. All other systems reviewed and are negative. Positives and Pertinent negatives as per HPI. PAST HISTORY     Past Medical History:  History reviewed. No pertinent past medical history. Past Surgical History:  Past Surgical History:   Procedure Laterality Date    HX  SECTION         Family History:  History reviewed. No pertinent family history.     Social History:  Social History     Tobacco Use    Smoking status: Never    Smokeless tobacco: Never Substance Use Topics    Alcohol use: Yes     Comment: occ    Drug use: No       Allergies:  No Known Allergies    CURRENT MEDICATIONS      Discharge Medication List as of 1/21/2023  1:22 PM        CONTINUE these medications which have NOT CHANGED    Details   diclofenac EC (VOLTAREN) 75 mg EC tablet Take 1 Tablet by mouth two (2) times daily as needed for Pain., Normal, Disp-20 Tablet, R-0Patient instructed not to take diclofenac and ibuprofen at the same time, but wants to see which one will work better. fluticasone propionate (FLONASE) 50 mcg/actuation nasal spray 2 Sprays by Both Nostrils route daily. , Normal, Disp-16 g, R-0      OTHER Indications: unknown birth control pill, Historical Med             PHYSICAL EXAM      ED Triage Vitals [01/21/23 1236]   ED Encounter Vitals Group      /75      Pulse (Heart Rate) 93      Resp Rate 18      Temp 98 °F (36.7 °C)      Temp src       O2 Sat (%) 96 %      Weight 180 lb      Height 5' 7\"        Physical Exam  Vitals and nursing note reviewed. Constitutional:       General: She is not in acute distress. Appearance: She is well-developed. She is not ill-appearing. Cardiovascular:      Rate and Rhythm: Normal rate and regular rhythm. Pulses: Normal pulses. Heart sounds: Normal heart sounds. Pulmonary:      Effort: Pulmonary effort is normal.      Breath sounds: Normal breath sounds. Musculoskeletal:         General: Normal range of motion. Cervical back: Normal range of motion and neck supple. Skin:     General: Skin is warm and dry. Comments: Intact sutures noted to the L side of upper lip with scabbing    Neurological:      Mental Status: She is alert and oriented to person, place, and time. GCS: GCS eye subscore is 4. GCS verbal subscore is 5. GCS motor subscore is 6. DIAGNOSTIC RESULTS   LABS:     No results found for this or any previous visit (from the past 12 hour(s)).         RADIOLOGY:  Non-plain film images such as CT, Ultrasound and MRI are read by the radiologist. Plain radiographic images are visualized and preliminarily interpreted by the ED Provider with the below findings:        Interpretation per the Radiologist below, if available at the time of this note:     No results found. PROCEDURES   Unless otherwise noted below, none  Suture/Staple Removal    Date/Time: 1/21/2023 1:18 PM  Performed by: Ger Bejarano NP  Authorized by: Ger Bejarano NP     Consent:     Consent obtained:  Verbal    Consent given by:  Patient    Risks, benefits, and alternatives were discussed: yes      Risks discussed:  Wound separation, pain and bleeding  Universal protocol:     Patient identity confirmed:  Verbally with patient  Location:     Location:  Mouth    Mouth location:  Upper exterior lip  Procedure details:     Objective wound description: scabbing and mildwound dehisence with redness and scant yellow drainage. Number of sutures removed:  7  Post-procedure details:     Post-removal:  Antibiotic ointment applied    Procedure completion:  Tolerated     EMERGENCY DEPARTMENT COURSE and DIFFERENTIAL DIAGNOSIS/MDM   Vitals:    Vitals:    01/21/23 1236   BP: 133/75   Pulse: 93   Resp: 18   Temp: 98 °F (36.7 °C)   SpO2: 96%   Weight: 81.6 kg (180 lb)   Height: 5' 7\" (1.702 m)        Patient was given the following medications:  Medications - No data to display    CONSULTS: (Who and What was discussed)  None    Chronic Conditions: None     Social Determinants affecting Dx or Tx: None    Records Reviewed (source and summary): Nursing Notes and Old Medical Records    MDM (CC/HPI Summary, DDx, ED Course, Reassessment, Disposition Considerations -Tests not done, Shared Decision Making, Pt Expectation of Test or Tx.):     61-year-old female presenting with suture removal exhibiting sutures noted to the left upper lip that exhibited scabbing.   Successful removal but there is areas of mild wound dehiscence however there is an area of skin to be removed due to scabbing. Discussed wound healing taking approximately 4 to 6 weeks. Will provide antibiotics and she complained of drainage after suture placement. FINAL IMPRESSION     1. Encounter for removal of sutures    2. Wound dehiscence          DISPOSITION/PLAN   Discharged        Care plan outlined and precautions discussed. Patient has no new complaints, changes, or physical findings. All of pt's questions and concerns were addressed. Patient was instructed and agrees to follow up with PCP, as well as to return to the ED upon further deterioration. Patient is ready to go home. PATIENT REFERRED TO:  Follow-up Information       Follow up With Specialties Details Why [address] [employer],[address] [employer]  Call  As needed, If symptoms worsen [address] [facility] [address] [place] [address]  [phone]              DISCHARGE MEDICATIONS:  Discharge Medication List as of 1/21/2023  1:22 PM        START taking these medications    Details   cephALEXin (Keflex) 500 mg capsule Take 1 Capsule by mouth two (2) times a day for 7 days. , Normal, Disp-14 Capsule, R-0           CONTINUE these medications which have NOT CHANGED    Details   diclofenac EC (VOLTAREN) 75 mg EC tablet Take 1 Tablet by mouth two (2) times daily as needed for Pain., Normal, Disp-20 Tablet, R-0Patient instructed not to take diclofenac and ibuprofen at the same time, but wants to see which one will work better. fluticasone propionate (FLONASE) 50 mcg/actuation nasal spray 2 Sprays by Both Nostrils route daily. , Normal, Disp-16 g, R-0      OTHER Indications: unknown birth control pill, Historical Med               DISCONTINUED MEDICATIONS:  Discharge Medication List as of 1/21/2023  1:22 PM          I am the Primary Clinician of Record.      Claude Mccormack NP (electronically signed)    (Please note that parts of this dictation were completed with voice recognition software. Quite often unanticipated grammatical, syntax, homophones, and other interpretive errors are inadvertently transcribed by the computer software. Please disregards these errors.  Please excuse any errors that have escaped final proofreading.)

## 2023-01-21 NOTE — DISCHARGE INSTRUCTIONS
It was a pleasure taking care of you at [facility] Emergency Department today. We know that when you come to [facility], you are entrusting us with your health, comfort, and safety. Our physicians and nurses honor that trust, and we truly appreciate the opportunity to care for you and your loved ones. We also value our feedback. If you receive a survey about your Emergency Department experience today, please fill it out. We care about our patients' feedback, and we listen to what you have to say. Thank you!

## 2023-02-16 ENCOUNTER — TRANSCRIBE ORDER (OUTPATIENT)
Dept: SCHEDULING | Age: 43
End: 2023-02-16

## 2023-02-16 DIAGNOSIS — Z12.31 ENCOUNTER FOR SCREENING MAMMOGRAM FOR MALIGNANT NEOPLASM OF BREAST: Primary | ICD-10-CM

## 2023-04-22 DIAGNOSIS — Z12.31 ENCOUNTER FOR SCREENING MAMMOGRAM FOR MALIGNANT NEOPLASM OF BREAST: Primary | ICD-10-CM
